# Patient Record
Sex: MALE | Race: ASIAN
[De-identification: names, ages, dates, MRNs, and addresses within clinical notes are randomized per-mention and may not be internally consistent; named-entity substitution may affect disease eponyms.]

---

## 2019-05-13 ENCOUNTER — HOSPITAL ENCOUNTER (EMERGENCY)
Dept: HOSPITAL 25 - UCEAST | Age: 24
Discharge: HOME | End: 2019-05-13
Payer: COMMERCIAL

## 2019-05-13 DIAGNOSIS — R20.0: ICD-10-CM

## 2019-05-13 DIAGNOSIS — R42: Primary | ICD-10-CM

## 2019-05-13 PROCEDURE — G0463 HOSPITAL OUTPT CLINIC VISIT: HCPCS

## 2019-05-13 PROCEDURE — 99211 OFF/OP EST MAY X REQ PHY/QHP: CPT

## 2019-05-13 PROCEDURE — 93005 ELECTROCARDIOGRAM TRACING: CPT

## 2019-05-13 PROCEDURE — 70450 CT HEAD/BRAIN W/O DYE: CPT

## 2019-07-10 ENCOUNTER — EMERGENCY (EMERGENCY)
Facility: HOSPITAL | Age: 24
LOS: 1 days | Discharge: HOME | End: 2019-07-10
Admitting: EMERGENCY MEDICINE
Payer: COMMERCIAL

## 2019-07-10 VITALS
TEMPERATURE: 98 F | SYSTOLIC BLOOD PRESSURE: 120 MMHG | HEART RATE: 70 BPM | RESPIRATION RATE: 16 BRPM | DIASTOLIC BLOOD PRESSURE: 79 MMHG | OXYGEN SATURATION: 100 %

## 2019-07-10 PROCEDURE — 99282 EMERGENCY DEPT VISIT SF MDM: CPT

## 2019-07-10 NOTE — ED PROVIDER NOTE - OBJECTIVE STATEMENT
24 y/o male no pmh c/o anxiety. Pt admits to returning home from college recently and having a lot of problems with his sister. Pt states that they argue all the time and she gets very jealous that he graduated college and is moving on with his life. Pts mother is with him and is able to corroborate the story. She states "I just want my family to be happy and everyone to live together.". Pt admit sto feeling anxious about the situation. Pt denies feeling depressed, SI or HI. Denies physical violence. Denies any other complaints. Pt and his mother is asking for psychiatry f/u.

## 2019-07-10 NOTE — ED PROVIDER NOTE - CLINICAL SUMMARY MEDICAL DECISION MAKING FREE TEXT BOX
22 y/o male w/ anxiety about family dynamics- no SI or HI, no risk of harm to self or others. Pt would like psych f/u as outpatient. WIll give crisis center info and dc.

## 2019-07-10 NOTE — ED ADULT TRIAGE NOTE - CHIEF COMPLAINT QUOTE
Pt st"I have hx of panic attacks/ anxiety there has been issues at home that are excerbating the anxiety." Denies SI /HI denies etoh /drug use. Pt calm pleasant affect smiling . pt st" Seeing therapist at [facility] for alittle bit but stopped no really working out....I am not on any medications." Mom with pt

## 2019-07-11 ENCOUNTER — EMERGENCY (EMERGENCY)
Facility: HOSPITAL | Age: 24
LOS: 1 days | Discharge: HOME | End: 2019-07-11
Attending: EMERGENCY MEDICINE
Payer: COMMERCIAL

## 2019-07-11 VITALS
RESPIRATION RATE: 16 BRPM | OXYGEN SATURATION: 97 % | SYSTOLIC BLOOD PRESSURE: 142 MMHG | DIASTOLIC BLOOD PRESSURE: 81 MMHG | TEMPERATURE: 98 F | HEART RATE: 78 BPM

## 2019-07-11 LAB
ANION GAP SERPL CALC-SCNC: 14 MMOL/L — SIGNIFICANT CHANGE UP (ref 5–17)
APAP SERPL-MCNC: <15 UG/ML — SIGNIFICANT CHANGE UP (ref 10–30)
APPEARANCE UR: CLEAR — SIGNIFICANT CHANGE UP
BASOPHILS # BLD AUTO: 0 K/UL — SIGNIFICANT CHANGE UP (ref 0–0.2)
BASOPHILS NFR BLD AUTO: 0.4 % — SIGNIFICANT CHANGE UP (ref 0–2)
BILIRUB UR-MCNC: NEGATIVE — SIGNIFICANT CHANGE UP
BUN SERPL-MCNC: 15 MG/DL — SIGNIFICANT CHANGE UP (ref 7–23)
CALCIUM SERPL-MCNC: 9.8 MG/DL — SIGNIFICANT CHANGE UP (ref 8.4–10.5)
CHLORIDE SERPL-SCNC: 98 MMOL/L — SIGNIFICANT CHANGE UP (ref 96–108)
CO2 SERPL-SCNC: 25 MMOL/L — SIGNIFICANT CHANGE UP (ref 22–31)
COLOR SPEC: COLORLESS — SIGNIFICANT CHANGE UP
CREAT SERPL-MCNC: 0.91 MG/DL — SIGNIFICANT CHANGE UP (ref 0.5–1.3)
DIFF PNL FLD: NEGATIVE — SIGNIFICANT CHANGE UP
EOSINOPHIL # BLD AUTO: 0.1 K/UL — SIGNIFICANT CHANGE UP (ref 0–0.5)
EOSINOPHIL NFR BLD AUTO: 1.7 % — SIGNIFICANT CHANGE UP (ref 0–6)
ETHANOL SERPL-MCNC: SIGNIFICANT CHANGE UP MG/DL (ref 0–10)
GLUCOSE SERPL-MCNC: 125 MG/DL — HIGH (ref 70–99)
GLUCOSE UR QL: NEGATIVE — SIGNIFICANT CHANGE UP
HCT VFR BLD CALC: 42.2 % — SIGNIFICANT CHANGE UP (ref 39–50)
HGB BLD-MCNC: 14.7 G/DL — SIGNIFICANT CHANGE UP (ref 13–17)
KETONES UR-MCNC: NEGATIVE — SIGNIFICANT CHANGE UP
LEUKOCYTE ESTERASE UR-ACNC: NEGATIVE — SIGNIFICANT CHANGE UP
LYMPHOCYTES # BLD AUTO: 2 K/UL — SIGNIFICANT CHANGE UP (ref 1–3.3)
LYMPHOCYTES # BLD AUTO: 26.5 % — SIGNIFICANT CHANGE UP (ref 13–44)
MCHC RBC-ENTMCNC: 30.4 PG — SIGNIFICANT CHANGE UP (ref 27–34)
MCHC RBC-ENTMCNC: 34.8 GM/DL — SIGNIFICANT CHANGE UP (ref 32–36)
MCV RBC AUTO: 87.3 FL — SIGNIFICANT CHANGE UP (ref 80–100)
MONOCYTES # BLD AUTO: 0.4 K/UL — SIGNIFICANT CHANGE UP (ref 0–0.9)
MONOCYTES NFR BLD AUTO: 5.5 % — SIGNIFICANT CHANGE UP (ref 2–14)
NEUTROPHILS # BLD AUTO: 4.8 K/UL — SIGNIFICANT CHANGE UP (ref 1.8–7.4)
NEUTROPHILS NFR BLD AUTO: 65.9 % — SIGNIFICANT CHANGE UP (ref 43–77)
NITRITE UR-MCNC: NEGATIVE — SIGNIFICANT CHANGE UP
PH UR: 6.5 — SIGNIFICANT CHANGE UP (ref 5–8)
PLATELET # BLD AUTO: 183 K/UL — SIGNIFICANT CHANGE UP (ref 150–400)
POTASSIUM SERPL-MCNC: 5 MMOL/L — SIGNIFICANT CHANGE UP (ref 3.5–5.3)
POTASSIUM SERPL-SCNC: 5 MMOL/L — SIGNIFICANT CHANGE UP (ref 3.5–5.3)
PROT UR-MCNC: NEGATIVE — SIGNIFICANT CHANGE UP
RBC # BLD: 4.84 M/UL — SIGNIFICANT CHANGE UP (ref 4.2–5.8)
RBC # FLD: 11.7 % — SIGNIFICANT CHANGE UP (ref 10.3–14.5)
SALICYLATES SERPL-MCNC: <2 MG/DL — LOW (ref 15–30)
SODIUM SERPL-SCNC: 137 MMOL/L — SIGNIFICANT CHANGE UP (ref 135–145)
SP GR SPEC: 1.01 — LOW (ref 1.01–1.02)
UROBILINOGEN FLD QL: NEGATIVE — SIGNIFICANT CHANGE UP
WBC # BLD: 7.4 K/UL — SIGNIFICANT CHANGE UP (ref 3.8–10.5)
WBC # FLD AUTO: 7.4 K/UL — SIGNIFICANT CHANGE UP (ref 3.8–10.5)

## 2019-07-11 PROCEDURE — 80048 BASIC METABOLIC PNL TOTAL CA: CPT

## 2019-07-11 PROCEDURE — 99284 EMERGENCY DEPT VISIT MOD MDM: CPT

## 2019-07-11 PROCEDURE — 99284 EMERGENCY DEPT VISIT MOD MDM: CPT | Mod: 25

## 2019-07-11 PROCEDURE — 93010 ELECTROCARDIOGRAM REPORT: CPT

## 2019-07-11 PROCEDURE — 80307 DRUG TEST PRSMV CHEM ANLYZR: CPT

## 2019-07-11 PROCEDURE — 81003 URINALYSIS AUTO W/O SCOPE: CPT

## 2019-07-11 PROCEDURE — 85027 COMPLETE CBC AUTOMATED: CPT

## 2019-07-11 PROCEDURE — 93005 ELECTROCARDIOGRAM TRACING: CPT

## 2019-07-11 PROCEDURE — 84443 ASSAY THYROID STIM HORMONE: CPT

## 2019-07-11 NOTE — ED ADULT NURSE NOTE - NSIMPLEMENTINTERV_GEN_ALL_ED
Implemented All Universal Safety Interventions:  [place] to call system. Call bell, personal items and telephone within reach. Instruct patient to call for assistance. Room bathroom lighting operational. Non-slip footwear when patient is off stretcher. Physically safe environment: no spills, clutter or unnecessary equipment. Stretcher in lowest position, wheels locked, appropriate side rails in place.
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
## 2019-07-11 NOTE — ED PROVIDER NOTE - CLINICAL SUMMARY MEDICAL DECISION MAKING FREE TEXT BOX
24 yo M c PMH of anxiety BIBEMS because pt sent out a "code" to his friend for help on text today, friend called 911, and pt brought to ED. pt denies SI/HI. On exam, VS wnl, non-focal neuro exam, no remarkable exam findings. psych labs/ekg pending. will consult psychiatry after BAL results. 1:1. will reassess. 22 yo M c PMH of anxiety BIBEMS because pt sent out a "code" to his friend for help on text today, friend called 911, and pt brought to ED. pt denies SI/HI. On exam, VS wnl, non-focal neuro exam, no remarkable exam findings. psych labs/ekg pending. will consult psychiatry after BAL results. 1:1. will reassess.  ATTG: Dr. Crowley

## 2019-07-11 NOTE — ED PROVIDER NOTE - NSFOLLOWUPCLINICS_GEN_ALL_ED_FT
[facility]  Psychiatry  [address] [place] [address]  Phone: [phone]  Fax:   Follow Up Time:     [facility]  Psychiatry  [address] [place] [address]  Phone: [phone]  Fax:   Follow Up Time:     [facility]  Psychiatry  [address] [place] [address]  Phone: [phone]  Fax:   Follow Up Time:     [facility]  Psychiatry  [address] [place] [address]  Phone: [phone]  Fax:   Follow Up Time:

## 2019-07-11 NOTE — ED PROVIDER NOTE - PHYSICAL EXAMINATION
NEURO: pupils 3 mm, PERRL, EOMI (CN III, IV, VI), facial sensation intact to light touch in all 3 divisions bilat (CN V), face is symmetric with normal eye closure, eye opening, and smile (CN VII), hearing is normal to rubbing fingers (CN VII), palate elevates symmetrically, phonation is normal (CN IX, X),  shoulder shrug intact bilat (CN XI), tongue is midline with nl movements and no atrophy (CN XII), finger to nose test nl bilat, negative pronator drift bilat speech is clear; 5/5 motor strength BUE and BLE: deltoids, biceps, triceps, wrist flexors/extensors, hand , hip flexors, knee flexors/extensors, plantar/dorsiflexors, hallux flexors/extensors; sensation intact to light touch BUE and BLE: C5-T1 and L3-S1; gait wnl
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
## 2019-07-12 VITALS
DIASTOLIC BLOOD PRESSURE: 74 MMHG | TEMPERATURE: 98 F | HEART RATE: 82 BPM | OXYGEN SATURATION: 100 % | SYSTOLIC BLOOD PRESSURE: 122 MMHG | RESPIRATION RATE: 18 BRPM

## 2019-07-12 DIAGNOSIS — F43.22 ADJUSTMENT DISORDER WITH ANXIETY: ICD-10-CM

## 2019-07-12 DIAGNOSIS — R69 ILLNESS, UNSPECIFIED: ICD-10-CM

## 2019-07-12 LAB — TSH SERPL-MCNC: 1.71 UIU/ML — SIGNIFICANT CHANGE UP (ref 0.27–4.2)

## 2019-07-12 PROCEDURE — 90792 PSYCH DIAG EVAL W/MED SRVCS: CPT | Mod: GT

## 2019-07-12 NOTE — ED BEHAVIORAL HEALTH ASSESSMENT NOTE - REFERRAL / APPOINTMENT DETAILS
The patient was provided with referrals for outpatient therapists and the [facility] crisis center.
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
## 2019-07-12 NOTE — ED ADULT NURSE REASSESSMENT NOTE - NS ED NURSE REASSESS COMMENT FT1
pt. was discharged to home accompanied by his parents w/ instructions to f/u @ [facility], outpt clinic in am. In addition, he was instructed to return to ED, if his symptoms gets worse.
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
## 2019-07-12 NOTE — ED BEHAVIORAL HEALTH ASSESSMENT NOTE - SAFETY PLAN DETAILS
The patient and the family agree to return to the ED if there are concerns of suicide or he develops severe depression or anxiety or any other psychiatric symptoms.
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
## 2019-07-16 ENCOUNTER — OUTPATIENT (OUTPATIENT)
Dept: OUTPATIENT SERVICES | Facility: HOSPITAL | Age: 24
LOS: 1 days | Discharge: HOME | End: 2019-07-16

## 2019-07-16 PROBLEM — F41.9 ANXIETY DISORDER, UNSPECIFIED: Chronic | Status: ACTIVE | Noted: 2019-07-11

## 2019-07-17 DIAGNOSIS — F42.9 OBSESSIVE-COMPULSIVE DISORDER, UNSPECIFIED: ICD-10-CM

## 2019-07-17 DIAGNOSIS — F43.20 ADJUSTMENT DISORDER, UNSPECIFIED: ICD-10-CM

## 2019-09-12 ENCOUNTER — INPATIENT (INPATIENT)
Facility: HOSPITAL | Age: 24
LOS: 0 days | Discharge: ANOTHER HOSPITAL | End: 2019-09-12
Attending: PSYCHIATRY & NEUROLOGY | Admitting: PSYCHIATRY & NEUROLOGY
Payer: COMMERCIAL

## 2019-09-12 ENCOUNTER — INPATIENT (INPATIENT)
Facility: HOSPITAL | Age: 24
LOS: 33 days | Discharge: HOME | End: 2019-10-16
Attending: PSYCHIATRY & NEUROLOGY | Admitting: PSYCHIATRY & NEUROLOGY
Payer: SELF-PAY

## 2019-09-12 VITALS — HEIGHT: 74 IN | WEIGHT: 138.01 LBS | TEMPERATURE: 96 F

## 2019-09-12 DIAGNOSIS — F29 UNSPECIFIED PSYCHOSIS NOT DUE TO A SUBSTANCE OR KNOWN PHYSIOLOGICAL CONDITION: ICD-10-CM

## 2019-09-12 PROCEDURE — 99221 1ST HOSP IP/OBS SF/LOW 40: CPT

## 2019-09-12 RX ORDER — DIPHENHYDRAMINE HCL 50 MG
50 CAPSULE ORAL EVERY 6 HOURS
Refills: 0 | Status: DISCONTINUED | OUTPATIENT
Start: 2019-09-12 | End: 2019-10-16

## 2019-09-12 RX ORDER — ACETAMINOPHEN 500 MG
650 TABLET ORAL EVERY 6 HOURS
Refills: 0 | Status: DISCONTINUED | OUTPATIENT
Start: 2019-09-12 | End: 2019-10-16

## 2019-09-12 NOTE — CHART NOTE - NSCHARTNOTEFT_GEN_A_CORE
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    PAST MEDICAL & SURGICAL HISTORY:  Anxiety  No pertinent past medical history  No significant past surgical history        Allergies    No Known Allergies    Intolerances        Social History:     FAMILY HISTORY:      MEDICATIONS  (STANDING):    MEDICATIONS  (PRN):      Vital Signs Last 24 Hrs  T(C): --  T(F): --  HR: --  BP: --  BP(mean): --  RR: --  SpO2: --  CAPILLARY BLOOD GLUCOSE            PHYSICAL EXAM:  GENERAL: NAD, well-developed  HEAD:  Atraumatic, Normocephalic  EYES: EOMI, PERRLA, conjunctiva and sclera clear  NECK: Supple, No JVD  CHEST/LUNG: Clear to auscultation bilaterally; No wheeze  HEART: Regular rate and rhythm; No murmurs, rubs, or gallops  ABDOMEN: Soft, Nontender, Nondistended; Bowel sounds present  EXTREMITIES:  2+ Peripheral Pulses, No clubbing, cyanosis, or edema  PSYCH: AAOx3  NEUROLOGY: non-focal  SKIN: In tact    LABS:                    RADIOLOGY & ADDITIONAL TESTS:    Assessment and Plan: 25 yo M with no significant PMH is admitted to [facility] with a primary psychiatric diagnosis of Non-organic psychosis. The pt currently denies having any medical complaints such as chest pain, sob, abdominal pain, n/v/d/c, or any problems with urination or bowel movements. The rest of his screening physical is unremarkable.    1.Non-organic psychosis-Plan: continue with meds as per primary psychiatric team

## 2019-09-13 PROCEDURE — 99222 1ST HOSP IP/OBS MODERATE 55: CPT

## 2019-09-13 RX ORDER — RISPERIDONE 4 MG/1
1 TABLET ORAL AT BEDTIME
Refills: 0 | Status: DISCONTINUED | OUTPATIENT
Start: 2019-09-13 | End: 2019-09-17

## 2019-09-13 RX ADMIN — RISPERIDONE 1 MILLIGRAM(S): 4 TABLET ORAL at 21:04

## 2019-09-13 RX ADMIN — Medication 1 MILLIGRAM(S): at 21:04

## 2019-09-13 RX ADMIN — Medication 2 MILLIGRAM(S): at 15:17

## 2019-09-14 RX ADMIN — Medication 1 MILLIGRAM(S): at 14:40

## 2019-09-14 RX ADMIN — Medication 1 MILLIGRAM(S): at 20:18

## 2019-09-14 RX ADMIN — RISPERIDONE 1 MILLIGRAM(S): 4 TABLET ORAL at 20:18

## 2019-09-14 RX ADMIN — Medication 1 MILLIGRAM(S): at 10:00

## 2019-09-15 VITALS — TEMPERATURE: 97 F

## 2019-09-15 PROCEDURE — 99231 SBSQ HOSP IP/OBS SF/LOW 25: CPT

## 2019-09-15 RX ADMIN — Medication 1 MILLIGRAM(S): at 09:32

## 2019-09-15 RX ADMIN — Medication 1 MILLIGRAM(S): at 14:29

## 2019-09-15 RX ADMIN — RISPERIDONE 1 MILLIGRAM(S): 4 TABLET ORAL at 21:15

## 2019-09-15 RX ADMIN — Medication 1 MILLIGRAM(S): at 19:16

## 2019-09-15 RX ADMIN — Medication 1 MILLIGRAM(S): at 23:32

## 2019-09-16 PROCEDURE — 99231 SBSQ HOSP IP/OBS SF/LOW 25: CPT

## 2019-09-16 RX ADMIN — RISPERIDONE 1 MILLIGRAM(S): 4 TABLET ORAL at 22:54

## 2019-09-16 RX ADMIN — Medication 1 MILLIGRAM(S): at 10:30

## 2019-09-16 RX ADMIN — Medication 2 MILLIGRAM(S): at 15:48

## 2019-09-16 RX ADMIN — Medication 2 MILLIGRAM(S): at 22:54

## 2019-09-17 PROCEDURE — 99231 SBSQ HOSP IP/OBS SF/LOW 25: CPT

## 2019-09-17 RX ORDER — RISPERIDONE 4 MG/1
2 TABLET ORAL AT BEDTIME
Refills: 0 | Status: DISCONTINUED | OUTPATIENT
Start: 2019-09-17 | End: 2019-10-16

## 2019-09-17 RX ADMIN — Medication 2 MILLIGRAM(S): at 08:34

## 2019-09-17 RX ADMIN — Medication 2 MILLIGRAM(S): at 13:08

## 2019-09-17 RX ADMIN — RISPERIDONE 2 MILLIGRAM(S): 4 TABLET ORAL at 20:42

## 2019-09-17 RX ADMIN — Medication 2 MILLIGRAM(S): at 20:42

## 2019-09-18 PROCEDURE — 99231 SBSQ HOSP IP/OBS SF/LOW 25: CPT

## 2019-09-18 RX ADMIN — Medication 3 MILLIGRAM(S): at 13:30

## 2019-09-18 RX ADMIN — Medication 2 MILLIGRAM(S): at 09:21

## 2019-09-18 RX ADMIN — Medication 3 MILLIGRAM(S): at 20:57

## 2019-09-18 RX ADMIN — RISPERIDONE 2 MILLIGRAM(S): 4 TABLET ORAL at 20:57

## 2019-09-19 PROCEDURE — 99231 SBSQ HOSP IP/OBS SF/LOW 25: CPT

## 2019-09-19 RX ADMIN — Medication 3 MILLIGRAM(S): at 21:12

## 2019-09-19 RX ADMIN — Medication 3 MILLIGRAM(S): at 11:06

## 2019-09-19 RX ADMIN — Medication 3 MILLIGRAM(S): at 15:33

## 2019-09-19 RX ADMIN — RISPERIDONE 2 MILLIGRAM(S): 4 TABLET ORAL at 21:12

## 2019-09-20 PROCEDURE — 99231 SBSQ HOSP IP/OBS SF/LOW 25: CPT

## 2019-09-20 RX ORDER — DOCUSATE SODIUM 100 MG
100 CAPSULE ORAL DAILY
Refills: 0 | Status: DISCONTINUED | OUTPATIENT
Start: 2019-09-20 | End: 2019-10-16

## 2019-09-20 RX ORDER — SENNA PLUS 8.6 MG/1
2 TABLET ORAL AT BEDTIME
Refills: 0 | Status: DISCONTINUED | OUTPATIENT
Start: 2019-09-20 | End: 2019-10-16

## 2019-09-20 RX ADMIN — Medication 3 MILLIGRAM(S): at 10:33

## 2019-09-20 RX ADMIN — Medication 3 MILLIGRAM(S): at 15:25

## 2019-09-20 RX ADMIN — Medication 2 MILLIGRAM(S): at 17:19

## 2019-09-20 RX ADMIN — Medication 4 MILLIGRAM(S): at 21:03

## 2019-09-20 RX ADMIN — RISPERIDONE 2 MILLIGRAM(S): 4 TABLET ORAL at 21:03

## 2019-09-20 RX ADMIN — Medication 100 MILLIGRAM(S): at 21:36

## 2019-09-21 RX ADMIN — Medication 100 MILLIGRAM(S): at 21:05

## 2019-09-21 RX ADMIN — Medication 4 MILLIGRAM(S): at 09:37

## 2019-09-21 RX ADMIN — Medication 4 MILLIGRAM(S): at 21:05

## 2019-09-21 RX ADMIN — Medication 4 MILLIGRAM(S): at 13:22

## 2019-09-21 RX ADMIN — RISPERIDONE 2 MILLIGRAM(S): 4 TABLET ORAL at 21:05

## 2019-09-22 RX ADMIN — Medication 4 MILLIGRAM(S): at 20:19

## 2019-09-22 RX ADMIN — Medication 4 MILLIGRAM(S): at 08:28

## 2019-09-22 RX ADMIN — RISPERIDONE 2 MILLIGRAM(S): 4 TABLET ORAL at 20:19

## 2019-09-22 RX ADMIN — Medication 100 MILLIGRAM(S): at 20:54

## 2019-09-22 RX ADMIN — Medication 4 MILLIGRAM(S): at 13:34

## 2019-09-23 PROCEDURE — 99231 SBSQ HOSP IP/OBS SF/LOW 25: CPT

## 2019-09-23 RX ADMIN — RISPERIDONE 2 MILLIGRAM(S): 4 TABLET ORAL at 20:20

## 2019-09-23 RX ADMIN — Medication 4 MILLIGRAM(S): at 20:20

## 2019-09-23 RX ADMIN — Medication 4 MILLIGRAM(S): at 08:58

## 2019-09-23 RX ADMIN — Medication 4 MILLIGRAM(S): at 14:13

## 2019-09-24 LAB
ALBUMIN SERPL ELPH-MCNC: 4.4 G/DL — SIGNIFICANT CHANGE UP (ref 3.3–5)
ALP SERPL-CCNC: 61 U/L — SIGNIFICANT CHANGE UP (ref 40–120)
ALT FLD-CCNC: 18 U/L — SIGNIFICANT CHANGE UP (ref 4–41)
ANION GAP SERPL CALC-SCNC: 13 MMO/L — SIGNIFICANT CHANGE UP (ref 7–14)
APPEARANCE UR: CLEAR — SIGNIFICANT CHANGE UP
AST SERPL-CCNC: 21 U/L — SIGNIFICANT CHANGE UP (ref 4–40)
BASOPHILS # BLD AUTO: 0.03 K/UL — SIGNIFICANT CHANGE UP (ref 0–0.2)
BASOPHILS NFR BLD AUTO: 0.7 % — SIGNIFICANT CHANGE UP (ref 0–2)
BILIRUB SERPL-MCNC: 0.4 MG/DL — SIGNIFICANT CHANGE UP (ref 0.2–1.2)
BILIRUB UR-MCNC: NEGATIVE — SIGNIFICANT CHANGE UP
BLOOD UR QL VISUAL: NEGATIVE — SIGNIFICANT CHANGE UP
BUN SERPL-MCNC: 14 MG/DL — SIGNIFICANT CHANGE UP (ref 7–23)
CALCIUM SERPL-MCNC: 9.5 MG/DL — SIGNIFICANT CHANGE UP (ref 8.4–10.5)
CHLORIDE SERPL-SCNC: 100 MMOL/L — SIGNIFICANT CHANGE UP (ref 98–107)
CO2 SERPL-SCNC: 27 MMOL/L — SIGNIFICANT CHANGE UP (ref 22–31)
COLOR SPEC: COLORLESS — SIGNIFICANT CHANGE UP
CREAT SERPL-MCNC: 0.82 MG/DL — SIGNIFICANT CHANGE UP (ref 0.5–1.3)
EOSINOPHIL # BLD AUTO: 0.13 K/UL — SIGNIFICANT CHANGE UP (ref 0–0.5)
EOSINOPHIL NFR BLD AUTO: 3 % — SIGNIFICANT CHANGE UP (ref 0–6)
GLUCOSE SERPL-MCNC: 110 MG/DL — HIGH (ref 70–99)
GLUCOSE UR-MCNC: NEGATIVE — SIGNIFICANT CHANGE UP
HCT VFR BLD CALC: 41 % — SIGNIFICANT CHANGE UP (ref 39–50)
HGB BLD-MCNC: 13.3 G/DL — SIGNIFICANT CHANGE UP (ref 13–17)
IMM GRANULOCYTES NFR BLD AUTO: 0.2 % — SIGNIFICANT CHANGE UP (ref 0–1.5)
KETONES UR-MCNC: NEGATIVE — SIGNIFICANT CHANGE UP
LEUKOCYTE ESTERASE UR-ACNC: NEGATIVE — SIGNIFICANT CHANGE UP
LYMPHOCYTES # BLD AUTO: 1.74 K/UL — SIGNIFICANT CHANGE UP (ref 1–3.3)
LYMPHOCYTES # BLD AUTO: 40.6 % — SIGNIFICANT CHANGE UP (ref 13–44)
MAGNESIUM SERPL-MCNC: 1.9 MG/DL — SIGNIFICANT CHANGE UP (ref 1.6–2.6)
MCHC RBC-ENTMCNC: 28.7 PG — SIGNIFICANT CHANGE UP (ref 27–34)
MCHC RBC-ENTMCNC: 32.4 % — SIGNIFICANT CHANGE UP (ref 32–36)
MCV RBC AUTO: 88.4 FL — SIGNIFICANT CHANGE UP (ref 80–100)
MONOCYTES # BLD AUTO: 0.27 K/UL — SIGNIFICANT CHANGE UP (ref 0–0.9)
MONOCYTES NFR BLD AUTO: 6.3 % — SIGNIFICANT CHANGE UP (ref 2–14)
NEUTROPHILS # BLD AUTO: 2.11 K/UL — SIGNIFICANT CHANGE UP (ref 1.8–7.4)
NEUTROPHILS NFR BLD AUTO: 49.2 % — SIGNIFICANT CHANGE UP (ref 43–77)
NITRITE UR-MCNC: NEGATIVE — SIGNIFICANT CHANGE UP
NRBC # FLD: 0 K/UL — SIGNIFICANT CHANGE UP (ref 0–0)
PH UR: 7 — SIGNIFICANT CHANGE UP (ref 5–8)
PLATELET # BLD AUTO: 169 K/UL — SIGNIFICANT CHANGE UP (ref 150–400)
PMV BLD: 10.3 FL — SIGNIFICANT CHANGE UP (ref 7–13)
POTASSIUM SERPL-MCNC: 4.3 MMOL/L — SIGNIFICANT CHANGE UP (ref 3.5–5.3)
POTASSIUM SERPL-SCNC: 4.3 MMOL/L — SIGNIFICANT CHANGE UP (ref 3.5–5.3)
PROT SERPL-MCNC: 7.1 G/DL — SIGNIFICANT CHANGE UP (ref 6–8.3)
PROT UR-MCNC: 10 — SIGNIFICANT CHANGE UP
RBC # BLD: 4.64 M/UL — SIGNIFICANT CHANGE UP (ref 4.2–5.8)
RBC # FLD: 11.9 % — SIGNIFICANT CHANGE UP (ref 10.3–14.5)
SODIUM SERPL-SCNC: 140 MMOL/L — SIGNIFICANT CHANGE UP (ref 135–145)
SP GR SPEC: 1.01 — SIGNIFICANT CHANGE UP (ref 1–1.04)
TSH SERPL-MCNC: 2.04 UIU/ML — SIGNIFICANT CHANGE UP (ref 0.27–4.2)
UROBILINOGEN FLD QL: NORMAL — SIGNIFICANT CHANGE UP
WBC # BLD: 4.29 K/UL — SIGNIFICANT CHANGE UP (ref 3.8–10.5)
WBC # FLD AUTO: 4.29 K/UL — SIGNIFICANT CHANGE UP (ref 3.8–10.5)

## 2019-09-24 PROCEDURE — 99232 SBSQ HOSP IP/OBS MODERATE 35: CPT

## 2019-09-24 PROCEDURE — 93010 ELECTROCARDIOGRAM REPORT: CPT

## 2019-09-24 RX ADMIN — Medication 4 MILLIGRAM(S): at 12:54

## 2019-09-24 RX ADMIN — Medication 100 MILLIGRAM(S): at 21:58

## 2019-09-24 RX ADMIN — RISPERIDONE 2 MILLIGRAM(S): 4 TABLET ORAL at 20:44

## 2019-09-24 RX ADMIN — SENNA PLUS 2 TABLET(S): 8.6 TABLET ORAL at 21:57

## 2019-09-24 RX ADMIN — Medication 4 MILLIGRAM(S): at 20:44

## 2019-09-24 RX ADMIN — Medication 4 MILLIGRAM(S): at 09:03

## 2019-09-25 PROCEDURE — 90870 ELECTROCONVULSIVE THERAPY: CPT

## 2019-09-25 PROCEDURE — 99231 SBSQ HOSP IP/OBS SF/LOW 25: CPT | Mod: 25

## 2019-09-25 RX ORDER — MIDAZOLAM HYDROCHLORIDE 1 MG/ML
2 INJECTION, SOLUTION INTRAMUSCULAR; INTRAVENOUS ONCE
Refills: 0 | Status: DISCONTINUED | OUTPATIENT
Start: 2019-09-25 | End: 2019-10-16

## 2019-09-25 RX ADMIN — Medication 650 MILLIGRAM(S): at 15:44

## 2019-09-25 RX ADMIN — RISPERIDONE 2 MILLIGRAM(S): 4 TABLET ORAL at 21:05

## 2019-09-25 RX ADMIN — Medication 4 MILLIGRAM(S): at 21:05

## 2019-09-25 RX ADMIN — Medication 4 MILLIGRAM(S): at 14:44

## 2019-09-25 RX ADMIN — Medication 650 MILLIGRAM(S): at 14:44

## 2019-09-26 PROCEDURE — 99231 SBSQ HOSP IP/OBS SF/LOW 25: CPT

## 2019-09-26 RX ADMIN — Medication 4 MILLIGRAM(S): at 09:20

## 2019-09-26 RX ADMIN — Medication 4 MILLIGRAM(S): at 12:23

## 2019-09-26 RX ADMIN — Medication 4 MILLIGRAM(S): at 20:19

## 2019-09-26 RX ADMIN — SENNA PLUS 2 TABLET(S): 8.6 TABLET ORAL at 20:19

## 2019-09-26 RX ADMIN — RISPERIDONE 2 MILLIGRAM(S): 4 TABLET ORAL at 20:19

## 2019-09-27 PROCEDURE — 90870 ELECTROCONVULSIVE THERAPY: CPT

## 2019-09-27 PROCEDURE — 99231 SBSQ HOSP IP/OBS SF/LOW 25: CPT | Mod: 25

## 2019-09-27 RX ORDER — ACETAMINOPHEN 500 MG
650 TABLET ORAL EVERY 6 HOURS
Refills: 0 | Status: DISCONTINUED | OUTPATIENT
Start: 2019-09-27 | End: 2019-10-16

## 2019-09-27 RX ADMIN — Medication 4 MILLIGRAM(S): at 08:40

## 2019-09-27 RX ADMIN — SENNA PLUS 2 TABLET(S): 8.6 TABLET ORAL at 21:18

## 2019-09-27 RX ADMIN — Medication 650 MILLIGRAM(S): at 13:12

## 2019-09-27 RX ADMIN — RISPERIDONE 2 MILLIGRAM(S): 4 TABLET ORAL at 21:18

## 2019-09-27 RX ADMIN — Medication 4 MILLIGRAM(S): at 21:18

## 2019-09-28 RX ADMIN — Medication 4 MILLIGRAM(S): at 21:04

## 2019-09-28 RX ADMIN — Medication 4 MILLIGRAM(S): at 14:26

## 2019-09-28 RX ADMIN — RISPERIDONE 2 MILLIGRAM(S): 4 TABLET ORAL at 21:04

## 2019-09-28 RX ADMIN — Medication 4 MILLIGRAM(S): at 09:41

## 2019-09-29 RX ADMIN — Medication 4 MILLIGRAM(S): at 09:16

## 2019-09-29 RX ADMIN — Medication 4 MILLIGRAM(S): at 20:42

## 2019-09-29 RX ADMIN — Medication 4 MILLIGRAM(S): at 13:59

## 2019-09-29 RX ADMIN — RISPERIDONE 2 MILLIGRAM(S): 4 TABLET ORAL at 20:42

## 2019-09-30 PROCEDURE — 99231 SBSQ HOSP IP/OBS SF/LOW 25: CPT | Mod: 25

## 2019-09-30 PROCEDURE — 90870 ELECTROCONVULSIVE THERAPY: CPT

## 2019-09-30 RX ADMIN — Medication 4 MILLIGRAM(S): at 20:45

## 2019-09-30 RX ADMIN — Medication 650 MILLIGRAM(S): at 12:54

## 2019-09-30 RX ADMIN — Medication 4 MILLIGRAM(S): at 15:43

## 2019-09-30 RX ADMIN — RISPERIDONE 2 MILLIGRAM(S): 4 TABLET ORAL at 20:45

## 2019-09-30 RX ADMIN — Medication 4 MILLIGRAM(S): at 08:31

## 2019-10-01 LAB — GLUCOSE BLDC GLUCOMTR-MCNC: 121 MG/DL — HIGH (ref 70–99)

## 2019-10-01 PROCEDURE — 90853 GROUP PSYCHOTHERAPY: CPT

## 2019-10-01 PROCEDURE — 99231 SBSQ HOSP IP/OBS SF/LOW 25: CPT | Mod: 25

## 2019-10-01 RX ADMIN — Medication 4 MILLIGRAM(S): at 14:42

## 2019-10-01 RX ADMIN — RISPERIDONE 2 MILLIGRAM(S): 4 TABLET ORAL at 21:33

## 2019-10-01 RX ADMIN — Medication 3 MILLIGRAM(S): at 21:33

## 2019-10-01 RX ADMIN — Medication 4 MILLIGRAM(S): at 08:45

## 2019-10-02 PROCEDURE — 99231 SBSQ HOSP IP/OBS SF/LOW 25: CPT | Mod: 25

## 2019-10-02 PROCEDURE — 90870 ELECTROCONVULSIVE THERAPY: CPT

## 2019-10-02 RX ADMIN — Medication 3 MILLIGRAM(S): at 21:13

## 2019-10-02 RX ADMIN — RISPERIDONE 2 MILLIGRAM(S): 4 TABLET ORAL at 21:13

## 2019-10-02 RX ADMIN — Medication 3 MILLIGRAM(S): at 08:55

## 2019-10-02 RX ADMIN — Medication 650 MILLIGRAM(S): at 13:17

## 2019-10-03 PROCEDURE — 99231 SBSQ HOSP IP/OBS SF/LOW 25: CPT

## 2019-10-03 RX ADMIN — Medication 3 MILLIGRAM(S): at 13:23

## 2019-10-03 RX ADMIN — RISPERIDONE 2 MILLIGRAM(S): 4 TABLET ORAL at 20:25

## 2019-10-03 RX ADMIN — Medication 2 MILLIGRAM(S): at 20:25

## 2019-10-03 RX ADMIN — Medication 3 MILLIGRAM(S): at 08:53

## 2019-10-04 PROBLEM — Z78.9 OTHER SPECIFIED HEALTH STATUS: Chronic | Status: ACTIVE | Noted: 2019-07-10

## 2019-10-04 PROCEDURE — 99231 SBSQ HOSP IP/OBS SF/LOW 25: CPT

## 2019-10-04 RX ADMIN — RISPERIDONE 2 MILLIGRAM(S): 4 TABLET ORAL at 21:17

## 2019-10-04 RX ADMIN — Medication 2 MILLIGRAM(S): at 08:55

## 2019-10-04 RX ADMIN — Medication 2 MILLIGRAM(S): at 21:17

## 2019-10-04 RX ADMIN — Medication 2 MILLIGRAM(S): at 12:51

## 2019-10-05 RX ADMIN — Medication 1 MILLIGRAM(S): at 09:29

## 2019-10-05 RX ADMIN — Medication 1 MILLIGRAM(S): at 21:26

## 2019-10-05 RX ADMIN — Medication 1 MILLIGRAM(S): at 13:12

## 2019-10-05 RX ADMIN — RISPERIDONE 2 MILLIGRAM(S): 4 TABLET ORAL at 21:26

## 2019-10-06 RX ADMIN — RISPERIDONE 2 MILLIGRAM(S): 4 TABLET ORAL at 20:40

## 2019-10-06 RX ADMIN — Medication 1 MILLIGRAM(S): at 14:05

## 2019-10-06 RX ADMIN — Medication 1 MILLIGRAM(S): at 20:39

## 2019-10-06 RX ADMIN — Medication 1 MILLIGRAM(S): at 09:46

## 2019-10-07 PROBLEM — Z00.00 ENCOUNTER FOR PREVENTIVE HEALTH EXAMINATION: Status: ACTIVE | Noted: 2019-10-07

## 2019-10-07 PROCEDURE — 99231 SBSQ HOSP IP/OBS SF/LOW 25: CPT | Mod: 25

## 2019-10-07 PROCEDURE — 90870 ELECTROCONVULSIVE THERAPY: CPT

## 2019-10-07 RX ADMIN — Medication 1 MILLIGRAM(S): at 16:00

## 2019-10-07 RX ADMIN — Medication 1 MILLIGRAM(S): at 20:11

## 2019-10-07 RX ADMIN — RISPERIDONE 2 MILLIGRAM(S): 4 TABLET ORAL at 20:11

## 2019-10-07 RX ADMIN — Medication 1 MILLIGRAM(S): at 08:49

## 2019-10-07 RX ADMIN — Medication 650 MILLIGRAM(S): at 14:16

## 2019-10-08 PROCEDURE — 99231 SBSQ HOSP IP/OBS SF/LOW 25: CPT

## 2019-10-08 RX ADMIN — Medication 1 MILLIGRAM(S): at 20:20

## 2019-10-08 RX ADMIN — Medication 1 MILLIGRAM(S): at 08:46

## 2019-10-08 RX ADMIN — Medication 1 MILLIGRAM(S): at 12:41

## 2019-10-08 RX ADMIN — RISPERIDONE 2 MILLIGRAM(S): 4 TABLET ORAL at 20:20

## 2019-10-09 PROCEDURE — 99231 SBSQ HOSP IP/OBS SF/LOW 25: CPT | Mod: 25

## 2019-10-09 PROCEDURE — 90870 ELECTROCONVULSIVE THERAPY: CPT

## 2019-10-09 RX ORDER — ONDANSETRON 8 MG/1
4 TABLET, FILM COATED ORAL EVERY 8 HOURS
Refills: 0 | Status: DISCONTINUED | OUTPATIENT
Start: 2019-10-09 | End: 2019-10-16

## 2019-10-09 RX ADMIN — RISPERIDONE 2 MILLIGRAM(S): 4 TABLET ORAL at 20:06

## 2019-10-09 RX ADMIN — Medication 650 MILLIGRAM(S): at 21:57

## 2019-10-09 RX ADMIN — Medication 650 MILLIGRAM(S): at 22:23

## 2019-10-09 RX ADMIN — Medication 650 MILLIGRAM(S): at 12:58

## 2019-10-09 RX ADMIN — Medication 1 MILLIGRAM(S): at 20:06

## 2019-10-09 RX ADMIN — Medication 1 MILLIGRAM(S): at 12:58

## 2019-10-10 PROCEDURE — 99231 SBSQ HOSP IP/OBS SF/LOW 25: CPT

## 2019-10-10 RX ADMIN — RISPERIDONE 2 MILLIGRAM(S): 4 TABLET ORAL at 21:11

## 2019-10-10 RX ADMIN — Medication 1 MILLIGRAM(S): at 09:20

## 2019-10-10 RX ADMIN — Medication 1 MILLIGRAM(S): at 13:48

## 2019-10-10 RX ADMIN — Medication 1 MILLIGRAM(S): at 21:11

## 2019-10-11 PROCEDURE — 99231 SBSQ HOSP IP/OBS SF/LOW 25: CPT | Mod: 25

## 2019-10-11 PROCEDURE — 90870 ELECTROCONVULSIVE THERAPY: CPT

## 2019-10-11 RX ORDER — MIDAZOLAM HYDROCHLORIDE 1 MG/ML
2 INJECTION, SOLUTION INTRAMUSCULAR; INTRAVENOUS ONCE
Refills: 0 | Status: DISCONTINUED | OUTPATIENT
Start: 2019-10-11 | End: 2019-10-11

## 2019-10-11 RX ADMIN — Medication 1 MILLIGRAM(S): at 22:06

## 2019-10-11 RX ADMIN — Medication 2 MILLIGRAM(S): at 17:17

## 2019-10-11 RX ADMIN — Medication 650 MILLIGRAM(S): at 19:12

## 2019-10-11 RX ADMIN — MIDAZOLAM HYDROCHLORIDE 2 MILLIGRAM(S): 1 INJECTION, SOLUTION INTRAMUSCULAR; INTRAVENOUS at 15:02

## 2019-10-11 RX ADMIN — RISPERIDONE 2 MILLIGRAM(S): 4 TABLET ORAL at 22:06

## 2019-10-11 RX ADMIN — Medication 650 MILLIGRAM(S): at 20:26

## 2019-10-12 RX ADMIN — RISPERIDONE 2 MILLIGRAM(S): 4 TABLET ORAL at 20:49

## 2019-10-12 RX ADMIN — Medication 1 MILLIGRAM(S): at 13:36

## 2019-10-12 RX ADMIN — Medication 1 MILLIGRAM(S): at 09:27

## 2019-10-12 RX ADMIN — Medication 1 MILLIGRAM(S): at 20:49

## 2019-10-13 RX ADMIN — Medication 1 MILLIGRAM(S): at 09:23

## 2019-10-13 RX ADMIN — Medication 1 MILLIGRAM(S): at 20:56

## 2019-10-13 RX ADMIN — Medication 1 MILLIGRAM(S): at 13:03

## 2019-10-13 RX ADMIN — RISPERIDONE 2 MILLIGRAM(S): 4 TABLET ORAL at 20:56

## 2019-10-14 PROCEDURE — 99231 SBSQ HOSP IP/OBS SF/LOW 25: CPT

## 2019-10-14 RX ADMIN — Medication 2 MILLIGRAM(S): at 21:46

## 2019-10-14 RX ADMIN — RISPERIDONE 2 MILLIGRAM(S): 4 TABLET ORAL at 21:46

## 2019-10-14 RX ADMIN — Medication 2 MILLIGRAM(S): at 13:32

## 2019-10-14 RX ADMIN — Medication 1 MILLIGRAM(S): at 09:18

## 2019-10-15 PROCEDURE — 99231 SBSQ HOSP IP/OBS SF/LOW 25: CPT

## 2019-10-15 RX ADMIN — Medication 2 MILLIGRAM(S): at 08:03

## 2019-10-15 RX ADMIN — Medication 2 MILLIGRAM(S): at 13:10

## 2019-10-15 RX ADMIN — Medication 2 MILLIGRAM(S): at 21:54

## 2019-10-15 RX ADMIN — RISPERIDONE 2 MILLIGRAM(S): 4 TABLET ORAL at 21:54

## 2019-10-16 VITALS — DIASTOLIC BLOOD PRESSURE: 70 MMHG | HEART RATE: 103 BPM | SYSTOLIC BLOOD PRESSURE: 122 MMHG | TEMPERATURE: 98 F

## 2019-10-16 PROCEDURE — 99239 HOSP IP/OBS DSCHRG MGMT >30: CPT

## 2019-10-16 RX ORDER — RISPERIDONE 4 MG/1
1 TABLET ORAL
Qty: 30 | Refills: 0
Start: 2019-10-16 | End: 2019-11-14

## 2019-10-16 RX ADMIN — Medication 2 MILLIGRAM(S): at 12:42

## 2019-10-16 RX ADMIN — Medication 2 MILLIGRAM(S): at 09:43

## 2019-10-18 ENCOUNTER — OUTPATIENT (OUTPATIENT)
Dept: OUTPATIENT SERVICES | Facility: HOSPITAL | Age: 24
LOS: 1 days | Discharge: HOME | End: 2019-10-18

## 2019-10-22 ENCOUNTER — APPOINTMENT (OUTPATIENT)
Dept: PSYCHIATRY | Facility: CLINIC | Age: 24
End: 2019-10-22

## 2020-12-29 ENCOUNTER — APPOINTMENT (OUTPATIENT)
Dept: INTERNAL MEDICINE | Facility: CLINIC | Age: 25
End: 2020-12-29

## 2022-01-08 ENCOUNTER — TRANSCRIPTION ENCOUNTER (OUTPATIENT)
Age: 27
End: 2022-01-08

## 2022-06-02 ENCOUNTER — NON-APPOINTMENT (OUTPATIENT)
Age: 27
End: 2022-06-02

## 2022-10-11 NOTE — ED ADULT NURSE NOTE - INSIGHT (REGARDING PSYCHIATRIC ILLNESS)
Spoke with patient about arrival time @ 0761.   Covid test = vacc    Preop CBC    NPO status reviewed: Light meals on Wednesday. Patient must have nothing to eat after midnight.      Medications: Do not take Insulin or oral diabetic medications the day of the procedure.  Take as prescribed: heart, seizure and blood pressure medication in the morning with a sip of water (less than an ounce).  Take any breathing medications and bring inhalers to hospital with you Leave all valuables and jewelry at home.     Wear comfortable clothes to procedure to change into hospital gown You cannot drive for 24 hours after your procedure because you will receive sedation for your procedure to make you comfortable.  A ride must be provided at discharge.               NOTE: Recent hospitalization 10/09-10/11 for bleeding.   
Poor

## 2024-09-07 ENCOUNTER — NON-APPOINTMENT (OUTPATIENT)
Age: 29
End: 2024-09-07

## 2025-01-23 ENCOUNTER — NON-APPOINTMENT (OUTPATIENT)
Age: 30
End: 2025-01-23